# Patient Record
Sex: MALE | Race: WHITE | NOT HISPANIC OR LATINO | Employment: FULL TIME | URBAN - METROPOLITAN AREA
[De-identification: names, ages, dates, MRNs, and addresses within clinical notes are randomized per-mention and may not be internally consistent; named-entity substitution may affect disease eponyms.]

---

## 2019-01-04 ENCOUNTER — HOSPITAL ENCOUNTER (OUTPATIENT)
Dept: OTHER | Facility: HOSPITAL | Age: 54
Discharge: HOME | End: 2019-01-04
Attending: OPHTHALMOLOGY | Admitting: OPHTHALMOLOGY

## 2019-01-04 LAB
ERYTHROCYTE [SEDIMENTATION RATE] IN BLOOD BY WESTERGREN METHOD: 8 MM/HR (ref 0–20)
T PALLIDUM IGG SER QL: NONREACTIVE

## 2019-01-06 LAB — B HENSELAE IGM TITR SER IF: NORMAL TITER

## 2019-01-07 ENCOUNTER — HOSPITAL ENCOUNTER (OUTPATIENT)
Dept: LAB | Facility: HOSPITAL | Age: 54
Discharge: HOME | End: 2019-01-07
Attending: OPHTHALMOLOGY | Admitting: OPHTHALMOLOGY

## 2019-01-07 LAB — B BURGDOR IGG+IGM SER-ACNC: NEGATIVE

## 2019-01-07 PROCEDURE — 86481 TB AG RESPONSE T-CELL SUSP: CPT

## 2019-01-08 ENCOUNTER — LAB REQUISITION (OUTPATIENT)
Dept: LAB | Facility: HOSPITAL | Age: 54
End: 2019-01-08

## 2019-01-08 DIAGNOSIS — Z00.00 ROUTINE GENERAL MEDICAL EXAMINATION AT A HEALTH CARE FACILITY: ICD-10-CM

## 2019-01-09 LAB
TSPOT INTERPRETATION: NEGATIVE
TSPOT INTERPRETATION: NORMAL
TSPOT NIL CONTROL INTERPRETATION: NORMAL
TSPOT PANEL A: 0
TSPOT PANEL B: 0
TSPOT POS CONTROL INTERPRETATION: NORMAL

## 2019-03-16 ENCOUNTER — HOSPITAL ENCOUNTER (OUTPATIENT)
Dept: URGENT CARE | Facility: CLINIC | Age: 54
Setting detail: SPECIMEN
Discharge: HOME | End: 2019-03-16
Attending: FAMILY MEDICINE | Admitting: FAMILY MEDICINE

## 2019-03-18 ENCOUNTER — HOSPITAL ENCOUNTER (OUTPATIENT)
Dept: LAB | Facility: HOSPITAL | Age: 54
Discharge: HOME | End: 2019-03-18
Attending: FAMILY MEDICINE | Admitting: FAMILY MEDICINE

## 2019-03-18 LAB
ALBUMIN SERPL-MCNC: 3.2 G/DL (ref 3.5–4.8)
ALBUMIN/GLOB SERPL: 1 {RATIO} (ref 1–1.7)
ALP SERPL-CCNC: 194 IU/L (ref 32–91)
ALT SERPL-CCNC: 147 IU/L (ref 17–63)
ANION GAP SERPL CALC-SCNC: 13.3 MMOL/L (ref 10–20)
AST SERPL-CCNC: 50 IU/L (ref 15–41)
BASOPHILS # BLD AUTO: 0 10*3/UL (ref 0–0.2)
BASOPHILS NFR BLD AUTO: 0 % (ref 0–2)
BILIRUB SERPL-MCNC: 1 MG/DL (ref 0.3–1.2)
BUN SERPL-MCNC: 10 MG/DL (ref 8–20)
BUN/CREAT SERPL: 12.5 (ref 6.2–20.3)
CALCIUM SERPL-MCNC: 9.2 MG/DL (ref 8.9–10.3)
CHLORIDE SERPL-SCNC: 100 MMOL/L (ref 101–111)
CK SERPL-CCNC: 26 IU/L (ref 49–397)
CONV CO2: 28 MMOL/L (ref 22–32)
CONV TOTAL PROTEIN: 6.5 G/DL (ref 6.1–7.9)
CREAT UR-MCNC: 0.8 MG/DL (ref 0.7–1.2)
DIFFERENTIAL METHOD BLD: (no result)
EOSINOPHIL # BLD AUTO: 0.5 10*3/UL (ref 0–0.3)
EOSINOPHIL # BLD AUTO: 5 % (ref 0–3)
ERYTHROCYTE [DISTWIDTH] IN BLOOD BY AUTOMATED COUNT: 13.8 % (ref 11.5–14.5)
GLOBULIN UR ELPH-MCNC: 3.3 G/DL (ref 2.5–3.8)
GLUCOSE SERPL-MCNC: 112 MG/DL (ref 65–99)
HCT VFR BLD AUTO: 42 % (ref 40–54)
HGB BLD-MCNC: 14.1 G/DL (ref 14–18)
LYMPHOCYTES # BLD AUTO: 1.6 10*3/UL (ref 0.8–4.8)
LYMPHOCYTES NFR BLD AUTO: 17 % (ref 18–42)
MCH RBC QN AUTO: 30.5 PG (ref 26–32)
MCHC RBC AUTO-ENTMCNC: 33.6 G/DL (ref 32–36)
MCV RBC AUTO: 90.8 FL (ref 80–94)
MONOCYTES # BLD AUTO: 1 10*3/UL (ref 0.1–1.3)
MONOCYTES NFR BLD AUTO: 11 % (ref 2–11)
NEUTROPHILS # BLD AUTO: 6.4 10*3/UL (ref 2.3–8.6)
NEUTROPHILS NFR BLD AUTO: 67 % (ref 50–75)
NRBC BLD AUTO-RTO: 0 /100{WBCS}
NRBC/RBC NFR BLD MANUAL: 0 10*3/UL
PLATELET # BLD AUTO: 239 10*3/UL (ref 150–450)
PMV BLD AUTO: 8.1 FL (ref 7.4–10.4)
POTASSIUM SERPL-SCNC: 4.3 MMOL/L (ref 3.6–5.1)
RBC # BLD AUTO: 4.62 10*6/UL (ref 4.6–6)
SODIUM SERPL-SCNC: 137 MMOL/L (ref 136–144)
WBC # BLD AUTO: 9.6 10*3/UL (ref 4.5–11.5)

## 2020-07-10 ENCOUNTER — HOSPITAL ENCOUNTER (OUTPATIENT)
Facility: HOSPITAL | Age: 55
Discharge: HOME | End: 2020-07-11
Attending: FAMILY MEDICINE | Admitting: HOSPITALIST

## 2020-07-10 ENCOUNTER — APPOINTMENT (OUTPATIENT)
Dept: CT IMAGING | Facility: HOSPITAL | Age: 55
End: 2020-07-10

## 2020-07-10 ENCOUNTER — APPOINTMENT (OUTPATIENT)
Dept: ULTRASOUND IMAGING | Facility: HOSPITAL | Age: 55
End: 2020-07-10

## 2020-07-10 DIAGNOSIS — K80.20 GALLSTONES: ICD-10-CM

## 2020-07-10 DIAGNOSIS — D72.829 LEUKOCYTOSIS, UNSPECIFIED TYPE: ICD-10-CM

## 2020-07-10 DIAGNOSIS — R10.11 RUQ PAIN: Primary | ICD-10-CM

## 2020-07-10 LAB
ALBUMIN SERPL-MCNC: 4.4 G/DL (ref 3.5–5.2)
ALBUMIN/GLOB SERPL: 2 G/DL
ALP SERPL-CCNC: 64 U/L (ref 39–117)
ALT SERPL W P-5'-P-CCNC: 18 U/L (ref 1–41)
ANION GAP SERPL CALCULATED.3IONS-SCNC: 12 MMOL/L (ref 5–15)
AST SERPL-CCNC: 17 U/L (ref 1–40)
BACTERIA UR QL AUTO: ABNORMAL /HPF
BASOPHILS # BLD AUTO: 0.1 10*3/MM3 (ref 0–0.2)
BASOPHILS NFR BLD AUTO: 0.5 % (ref 0–1.5)
BILIRUB SERPL-MCNC: 0.9 MG/DL (ref 0–1.2)
BILIRUB UR QL STRIP: ABNORMAL
BUN SERPL-MCNC: 13 MG/DL (ref 6–20)
BUN SERPL-MCNC: ABNORMAL MG/DL
BUN/CREAT SERPL: ABNORMAL
CALCIUM SPEC-SCNC: 9.1 MG/DL (ref 8.6–10.5)
CHLORIDE SERPL-SCNC: 100 MMOL/L (ref 98–107)
CLARITY UR: CLEAR
CO2 SERPL-SCNC: 24 MMOL/L (ref 22–29)
COLOR UR: ABNORMAL
CREAT SERPL-MCNC: 0.68 MG/DL (ref 0.76–1.27)
DEPRECATED RDW RBC AUTO: 40.7 FL (ref 37–54)
EOSINOPHIL # BLD AUTO: 0 10*3/MM3 (ref 0–0.4)
EOSINOPHIL NFR BLD AUTO: 0.1 % (ref 0.3–6.2)
ERYTHROCYTE [DISTWIDTH] IN BLOOD BY AUTOMATED COUNT: 13.1 % (ref 12.3–15.4)
GFR SERPL CREATININE-BSD FRML MDRD: 120 ML/MIN/1.73
GLOBULIN UR ELPH-MCNC: 2.2 GM/DL
GLUCOSE SERPL-MCNC: 128 MG/DL (ref 65–99)
GLUCOSE UR STRIP-MCNC: NEGATIVE MG/DL
HCT VFR BLD AUTO: 43.4 % (ref 37.5–51)
HGB BLD-MCNC: 15 G/DL (ref 13–17.7)
HGB UR QL STRIP.AUTO: ABNORMAL
HOLD SPECIMEN: NORMAL
HYALINE CASTS UR QL AUTO: ABNORMAL /LPF
KETONES UR QL STRIP: ABNORMAL
LEUKOCYTE ESTERASE UR QL STRIP.AUTO: NEGATIVE
LIPASE SERPL-CCNC: 21 U/L (ref 13–60)
LYMPHOCYTES # BLD AUTO: 1.3 10*3/MM3 (ref 0.7–3.1)
LYMPHOCYTES NFR BLD AUTO: 8.8 % (ref 19.6–45.3)
MCH RBC QN AUTO: 30.3 PG (ref 26.6–33)
MCHC RBC AUTO-ENTMCNC: 34.5 G/DL (ref 31.5–35.7)
MCV RBC AUTO: 87.9 FL (ref 79–97)
MONOCYTES # BLD AUTO: 0.3 10*3/MM3 (ref 0.1–0.9)
MONOCYTES NFR BLD AUTO: 2.2 % (ref 5–12)
NEUTROPHILS NFR BLD AUTO: 13.2 10*3/MM3 (ref 1.7–7)
NEUTROPHILS NFR BLD AUTO: 88.4 % (ref 42.7–76)
NITRITE UR QL STRIP: NEGATIVE
NRBC BLD AUTO-RTO: 0 /100 WBC (ref 0–0.2)
PH UR STRIP.AUTO: 5.5 [PH] (ref 5–8)
PLATELET # BLD AUTO: 216 10*3/MM3 (ref 140–450)
PMV BLD AUTO: 8 FL (ref 6–12)
POTASSIUM SERPL-SCNC: 4.5 MMOL/L (ref 3.5–5.2)
PROT SERPL-MCNC: 6.6 G/DL (ref 6–8.5)
PROT UR QL STRIP: ABNORMAL
RBC # BLD AUTO: 4.93 10*6/MM3 (ref 4.14–5.8)
RBC # UR: ABNORMAL /HPF
REF LAB TEST METHOD: ABNORMAL
SODIUM SERPL-SCNC: 136 MMOL/L (ref 136–145)
SP GR UR STRIP: 1.04 (ref 1–1.03)
SQUAMOUS #/AREA URNS HPF: ABNORMAL /HPF
UROBILINOGEN UR QL STRIP: ABNORMAL
WBC # BLD AUTO: 14.9 10*3/MM3 (ref 3.4–10.8)
WBC UR QL AUTO: ABNORMAL /HPF
WHOLE BLOOD HOLD SPECIMEN: NORMAL

## 2020-07-10 PROCEDURE — 96376 TX/PRO/DX INJ SAME DRUG ADON: CPT

## 2020-07-10 PROCEDURE — 25810000003 SODIUM CHLORIDE 0.9 % WITH KCL 20 MEQ 20-0.9 MEQ/L-% SOLUTION: Performed by: NURSE PRACTITIONER

## 2020-07-10 PROCEDURE — 25010000002 MORPHINE PER 10 MG: Performed by: NURSE PRACTITIONER

## 2020-07-10 PROCEDURE — 25010000002 ONDANSETRON PER 1 MG: Performed by: NURSE PRACTITIONER

## 2020-07-10 PROCEDURE — 81001 URINALYSIS AUTO W/SCOPE: CPT | Performed by: NURSE PRACTITIONER

## 2020-07-10 PROCEDURE — G0378 HOSPITAL OBSERVATION PER HR: HCPCS

## 2020-07-10 PROCEDURE — 96374 THER/PROPH/DIAG INJ IV PUSH: CPT

## 2020-07-10 PROCEDURE — 76705 ECHO EXAM OF ABDOMEN: CPT

## 2020-07-10 PROCEDURE — 25010000002 MORPHINE PER 10 MG

## 2020-07-10 PROCEDURE — 83690 ASSAY OF LIPASE: CPT | Performed by: NURSE PRACTITIONER

## 2020-07-10 PROCEDURE — 0 IOPAMIDOL PER 1 ML: Performed by: NURSE PRACTITIONER

## 2020-07-10 PROCEDURE — 96375 TX/PRO/DX INJ NEW DRUG ADDON: CPT

## 2020-07-10 PROCEDURE — 80053 COMPREHEN METABOLIC PANEL: CPT | Performed by: NURSE PRACTITIONER

## 2020-07-10 PROCEDURE — 99284 EMERGENCY DEPT VISIT MOD MDM: CPT

## 2020-07-10 PROCEDURE — 25010000002 HYDROMORPHONE PER 4 MG: Performed by: NURSE PRACTITIONER

## 2020-07-10 PROCEDURE — 85025 COMPLETE CBC W/AUTO DIFF WBC: CPT | Performed by: NURSE PRACTITIONER

## 2020-07-10 PROCEDURE — 99219 PR INITIAL OBSERVATION CARE/DAY 50 MINUTES: CPT | Performed by: NURSE PRACTITIONER

## 2020-07-10 PROCEDURE — 74177 CT ABD & PELVIS W/CONTRAST: CPT

## 2020-07-10 RX ORDER — MORPHINE SULFATE 4 MG/ML
INJECTION, SOLUTION INTRAMUSCULAR; INTRAVENOUS
Status: COMPLETED
Start: 2020-07-10 | End: 2020-07-10

## 2020-07-10 RX ORDER — MORPHINE SULFATE 4 MG/ML
4 INJECTION, SOLUTION INTRAMUSCULAR; INTRAVENOUS ONCE
Status: COMPLETED | OUTPATIENT
Start: 2020-07-10 | End: 2020-07-10

## 2020-07-10 RX ORDER — SODIUM CHLORIDE AND POTASSIUM CHLORIDE 150; 900 MG/100ML; MG/100ML
100 INJECTION, SOLUTION INTRAVENOUS CONTINUOUS
Status: DISCONTINUED | OUTPATIENT
Start: 2020-07-10 | End: 2020-07-11 | Stop reason: HOSPADM

## 2020-07-10 RX ORDER — ONDANSETRON 4 MG/1
4 TABLET, FILM COATED ORAL EVERY 6 HOURS PRN
Status: DISCONTINUED | OUTPATIENT
Start: 2020-07-10 | End: 2020-07-11 | Stop reason: HOSPADM

## 2020-07-10 RX ORDER — SODIUM CHLORIDE 0.9 % (FLUSH) 0.9 %
10 SYRINGE (ML) INJECTION EVERY 12 HOURS SCHEDULED
Status: DISCONTINUED | OUTPATIENT
Start: 2020-07-10 | End: 2020-07-11 | Stop reason: HOSPADM

## 2020-07-10 RX ORDER — ONDANSETRON 2 MG/ML
4 INJECTION INTRAMUSCULAR; INTRAVENOUS EVERY 6 HOURS PRN
Status: DISCONTINUED | OUTPATIENT
Start: 2020-07-10 | End: 2020-07-11 | Stop reason: HOSPADM

## 2020-07-10 RX ORDER — ONDANSETRON 2 MG/ML
4 INJECTION INTRAMUSCULAR; INTRAVENOUS ONCE
Status: COMPLETED | OUTPATIENT
Start: 2020-07-10 | End: 2020-07-10

## 2020-07-10 RX ORDER — NICOTINE 21 MG/24HR
1 PATCH, TRANSDERMAL 24 HOURS TRANSDERMAL
Status: DISCONTINUED | OUTPATIENT
Start: 2020-07-11 | End: 2020-07-11 | Stop reason: HOSPADM

## 2020-07-10 RX ORDER — SODIUM CHLORIDE 0.9 % (FLUSH) 0.9 %
10 SYRINGE (ML) INJECTION AS NEEDED
Status: DISCONTINUED | OUTPATIENT
Start: 2020-07-10 | End: 2020-07-11 | Stop reason: HOSPADM

## 2020-07-10 RX ORDER — HYDROMORPHONE HCL 110MG/55ML
1 PATIENT CONTROLLED ANALGESIA SYRINGE INTRAVENOUS ONCE
Status: COMPLETED | OUTPATIENT
Start: 2020-07-10 | End: 2020-07-10

## 2020-07-10 RX ADMIN — ONDANSETRON 4 MG: 2 INJECTION, SOLUTION INTRAMUSCULAR; INTRAVENOUS at 15:57

## 2020-07-10 RX ADMIN — SODIUM CHLORIDE AND POTASSIUM CHLORIDE 100 ML/HR: 9; 1.49 INJECTION, SOLUTION INTRAVENOUS at 22:11

## 2020-07-10 RX ADMIN — IOPAMIDOL 100 ML: 755 INJECTION, SOLUTION INTRAVENOUS at 17:12

## 2020-07-10 RX ADMIN — MORPHINE SULFATE 4 MG: 4 INJECTION INTRAVENOUS at 16:49

## 2020-07-10 RX ADMIN — Medication 10 ML: at 22:10

## 2020-07-10 RX ADMIN — SODIUM CHLORIDE 1000 ML: 900 INJECTION, SOLUTION INTRAVENOUS at 16:02

## 2020-07-10 RX ADMIN — HYDROMORPHONE HYDROCHLORIDE 1 MG: 2 INJECTION, SOLUTION INTRAMUSCULAR; INTRAVENOUS; SUBCUTANEOUS at 20:31

## 2020-07-10 RX ADMIN — ONDANSETRON 4 MG: 2 INJECTION INTRAMUSCULAR; INTRAVENOUS at 22:10

## 2020-07-10 RX ADMIN — MORPHINE SULFATE 4 MG: 4 INJECTION INTRAVENOUS at 15:56

## 2020-07-10 RX ADMIN — MORPHINE SULFATE 4 MG: 4 INJECTION, SOLUTION INTRAMUSCULAR; INTRAVENOUS at 16:49

## 2020-07-10 NOTE — ED PROVIDER NOTES
Subjective   Patient is a 55-year-old white male with history of diverticulitis who presents today with complaints of upper abdominal pain, nausea and dry heaves.  He states his symptoms started suddenly around 9:00 this morning.  He denies any fever or chills.  He denies any diarrhea or constipation.  He states his symptoms feel similar to those he had when he was diagnosed with diverticulitis.  He denies any dysuria frequency urgency or difficulty urinating.  He denies any chest pain or shortness of breath.  He denies any alleviating or exacerbating factors.          Review of Systems   Constitutional: Negative for chills and fever.   Respiratory: Negative for shortness of breath.    Cardiovascular: Negative for chest pain.   Gastrointestinal: Positive for abdominal pain and nausea. Negative for constipation, diarrhea and vomiting.   Genitourinary: Negative for decreased urine volume, difficulty urinating, dysuria, frequency and urgency.   Musculoskeletal: Negative for back pain.   Skin: Negative for rash.   Neurological: Negative for headaches.       No past medical history on file.    Allergies   Allergen Reactions   • Hydrocodone-Acetaminophen Hives and Nausea Only       No past surgical history on file.    No family history on file.    Social History     Socioeconomic History   • Marital status:      Spouse name: Not on file   • Number of children: Not on file   • Years of education: Not on file   • Highest education level: Not on file           Objective   Physical Exam  Vital signs and triage nurse note reviewed.  Constitutional: Awake, alert; well-developed and well-nourished.  Moderate distress is noted due to pain.  HEENT: Normocephalic, atraumatic; pupils are PERRL with intact EOM; oropharynx is pink and moist without exudate or erythema.  No drooling or pooling of oral secretions.  Neck: Supple, full range of motion without pain; no cervical lymphadenopathy. Normal phonation.  Cardiovascular:  Regular rate and rhythm, normal S1-S2.  No murmur noted.  Pulmonary: Respiratory effort regular nonlabored, breath sounds clear to auscultation all fields.  Abdomen: Soft, tender over the epigastrium and right upper quadrant, nondistended with normoactive bowel sounds; no rebound but with mild voluntary guarding.  No CVAT.  Musculoskeletal: Independent range of motion of all extremities with no palpable tenderness or edema.  Neuro: Alert oriented x3, speech is clear and appropriate, GCS 15.    Skin: Flesh tone, warm, dry, intact; no erythematous or petechial rash or lesion.      Procedures           ED Course  ED Course as of Jul 10 2010   Fri Jul 10, 2020   1839 Care turned over to JEFF Alvarenga at 1815 pending US results and disposition.    [MD]      ED Course User Index  [MD] Lilia Gonzalez APRN      Labs Reviewed   COMPREHENSIVE METABOLIC PANEL - Abnormal; Notable for the following components:       Result Value    Glucose 128 (*)     Creatinine 0.68 (*)     All other components within normal limits    Narrative:     GFR Normal >60  Chronic Kidney Disease <60  Kidney Failure <15     URINALYSIS W/ CULTURE IF INDICATED - Abnormal; Notable for the following components:    Color, UA Dark Yellow (*)     Specific Gravity, UA 1.036 (*)     Ketones, UA 40 mg/dL (2+) (*)     Bilirubin, UA Small (1+) (*)     Blood, UA Trace (*)     Protein, UA Trace (*)     All other components within normal limits   CBC WITH AUTO DIFFERENTIAL - Abnormal; Notable for the following components:    WBC 14.90 (*)     Neutrophil % 88.4 (*)     Lymphocyte % 8.8 (*)     Monocyte % 2.2 (*)     Eosinophil % 0.1 (*)     Neutrophils, Absolute 13.20 (*)     All other components within normal limits   URINALYSIS, MICROSCOPIC ONLY - Abnormal; Notable for the following components:    RBC, UA 13-20 (*)     WBC, UA 0-2 (*)     All other components within normal limits   LIPASE - Normal   BUN - Normal   CBC AND DIFFERENTIAL    Narrative:     The following  orders were created for panel order CBC & Differential.  Procedure                               Abnormality         Status                     ---------                               -----------         ------                     CBC Auto Differential[[id number]]        Abnormal            Final result                 Please view results for these tests on the individual orders.   EXTRA TUBES    Narrative:     The following orders were created for panel order Extra Tubes.  Procedure                               Abnormality         Status                     ---------                               -----------         ------                     Light Blue Top[[id number]]                                   Final result               Gold Top - SST[[id number]]                                   Final result                 Please view results for these tests on the individual orders.   LIGHT BLUE TOP   GOLD TOP - SST     Ct Abdomen Pelvis With Contrast    Result Date: 7/10/2020  1.No acute process identified within abdomen/pelvis. 2.Cholelithiasis. 3.Sigmoid diverticulosis.  Electronically Signed By-DR. Chuck Gee MD On:7/10/2020 6:09 PM This report was finalized on [id number] by DR. Chuck Gee MD.    Us Gallbladder    Result Date: 7/10/2020  Cholelithiasis, but no convincing evidence of acute cholecystitis.  Electronically Signed By-DR. Chuck Gee MD On:7/10/2020 7:43 PM This report was finalized on [id number] by DR. Chuck Gee MD.    Medications   sodium chloride 0.9 % flush 10 mL (has no administration in time range)   HYDROmorphone (DILAUDID) injection 1 mg (has no administration in time range)   sodium chloride 0.9 % bolus 1,000 mL (0 mL Intravenous Stopped 7/10/20 1714)   Morphine sulfate (PF) injection 4 mg (4 mg Intravenous Given 7/10/20 1556)   ondansetron (ZOFRAN) injection 4 mg (4 mg Intravenous Given 7/10/20 1557)   Morphine sulfate (PF) injection 4 mg (4 mg Intravenous Given  7/10/20 1649)   iopamidol (ISOVUE-370) 76 % injection 100 mL (100 mL Intravenous Given 7/10/20 1712)                                          MDM  Number of Diagnoses or Management Options  Gallstones:   Leukocytosis, unspecified type:   RUQ pain:   Diagnosis management comments: Comorbidities: Diverticulitis  Differentials: Cholecystitis, cholelithiasis, pancreatitis, gastritis, hepatitis, obstruction, diverticulitis;this list is not all inclusive and does not constitute the entirety of considered causes  Discussion with provider:  Radiology interpretation: X-rays reviewed by me and interpreted by radiologist: As above  Lab interpretation: Labs viewed by me significant for: As above    Patient had IV established.  He had labs and CT obtained.  He was given a fluid bolus as well as morphine and Zofran for pain and nausea.  Patient required additional doses of pain medication he continued to have moderate to severe pain in his upper abdomen.  White blood cell count returned at 14,000.  He is afebrile and hemodynamically stable.  CT scan shows cholelithiasis with no other acute findings.  Ultrasound of the gallbladder was ordered.    Patient care assumed from Celine MARTINEZ at 1830 - Ultrasound shows gallstones without acute infection.   Patient on reevaluation was still in a significant amount of pain and was given 1mg of Dilaudid. And will be admitted to the Hospitalist.         Patient made aware and agreeable to this care.     Will be discussed with Gayathri MARTINEZ and admitted to Dr. Madison             Amount and/or Complexity of Data Reviewed  Clinical lab tests: ordered and reviewed  Tests in the radiology section of CPT®: ordered and reviewed    Patient Progress  Patient progress: stable      Final diagnoses:   RUQ pain   Gallstones   Leukocytosis, unspecified type            Grace Peralta, APRN  07/10/20 2015

## 2020-07-11 ENCOUNTER — APPOINTMENT (OUTPATIENT)
Dept: GENERAL RADIOLOGY | Facility: HOSPITAL | Age: 55
End: 2020-07-11

## 2020-07-11 ENCOUNTER — ANESTHESIA EVENT (OUTPATIENT)
Dept: PERIOP | Facility: HOSPITAL | Age: 55
End: 2020-07-11

## 2020-07-11 ENCOUNTER — ANESTHESIA (OUTPATIENT)
Dept: PERIOP | Facility: HOSPITAL | Age: 55
End: 2020-07-11

## 2020-07-11 VITALS
HEART RATE: 76 BPM | BODY MASS INDEX: 41.81 KG/M2 | DIASTOLIC BLOOD PRESSURE: 80 MMHG | SYSTOLIC BLOOD PRESSURE: 144 MMHG | TEMPERATURE: 98.6 F | RESPIRATION RATE: 18 BRPM | HEIGHT: 66 IN | OXYGEN SATURATION: 92 % | WEIGHT: 260.14 LBS

## 2020-07-11 PROBLEM — R10.11 RUQ PAIN: Status: RESOLVED | Noted: 2020-07-10 | Resolved: 2020-07-11

## 2020-07-11 PROBLEM — K80.50 BILIARY COLIC: Status: RESOLVED | Noted: 2020-07-11 | Resolved: 2020-07-11

## 2020-07-11 PROBLEM — K80.50 BILIARY COLIC: Status: ACTIVE | Noted: 2020-07-11

## 2020-07-11 LAB
ABO GROUP BLD: NORMAL
ANION GAP SERPL CALCULATED.3IONS-SCNC: 7 MMOL/L (ref 5–15)
APTT PPP: 27.1 SECONDS (ref 24–31)
BASOPHILS # BLD AUTO: 0.1 10*3/MM3 (ref 0–0.2)
BASOPHILS NFR BLD AUTO: 0.5 % (ref 0–1.5)
BLD GP AB SCN SERPL QL: NEGATIVE
BUN SERPL-MCNC: 7 MG/DL (ref 6–20)
BUN SERPL-MCNC: ABNORMAL MG/DL
BUN/CREAT SERPL: ABNORMAL
CALCIUM SPEC-SCNC: 8.8 MG/DL (ref 8.6–10.5)
CHLORIDE SERPL-SCNC: 105 MMOL/L (ref 98–107)
CO2 SERPL-SCNC: 29 MMOL/L (ref 22–29)
CREAT SERPL-MCNC: 0.72 MG/DL (ref 0.76–1.27)
DEPRECATED RDW RBC AUTO: 40.7 FL (ref 37–54)
EOSINOPHIL # BLD AUTO: 0.1 10*3/MM3 (ref 0–0.4)
EOSINOPHIL NFR BLD AUTO: 0.6 % (ref 0.3–6.2)
ERYTHROCYTE [DISTWIDTH] IN BLOOD BY AUTOMATED COUNT: 13.1 % (ref 12.3–15.4)
GFR SERPL CREATININE-BSD FRML MDRD: 113 ML/MIN/1.73
GLUCOSE SERPL-MCNC: 105 MG/DL (ref 65–99)
HCT VFR BLD AUTO: 43.1 % (ref 37.5–51)
HGB BLD-MCNC: 14.4 G/DL (ref 13–17.7)
INR PPP: 1.06 (ref 0.9–1.1)
LYMPHOCYTES # BLD AUTO: 2.9 10*3/MM3 (ref 0.7–3.1)
LYMPHOCYTES NFR BLD AUTO: 20.6 % (ref 19.6–45.3)
MCH RBC QN AUTO: 30 PG (ref 26.6–33)
MCHC RBC AUTO-ENTMCNC: 33.4 G/DL (ref 31.5–35.7)
MCV RBC AUTO: 89.8 FL (ref 79–97)
MONOCYTES # BLD AUTO: 0.9 10*3/MM3 (ref 0.1–0.9)
MONOCYTES NFR BLD AUTO: 6.5 % (ref 5–12)
NEUTROPHILS NFR BLD AUTO: 10 10*3/MM3 (ref 1.7–7)
NEUTROPHILS NFR BLD AUTO: 71.8 % (ref 42.7–76)
NRBC BLD AUTO-RTO: 0.1 /100 WBC (ref 0–0.2)
PLATELET # BLD AUTO: 216 10*3/MM3 (ref 140–450)
PMV BLD AUTO: 8 FL (ref 6–12)
POTASSIUM SERPL-SCNC: 4.6 MMOL/L (ref 3.5–5.2)
PROTHROMBIN TIME: 11 SECONDS (ref 9.6–11.7)
RBC # BLD AUTO: 4.81 10*6/MM3 (ref 4.14–5.8)
RH BLD: POSITIVE
SARS-COV-2 RNA PNL SPEC NAA+PROBE: NOT DETECTED
SODIUM SERPL-SCNC: 141 MMOL/L (ref 136–145)
T&S EXPIRATION DATE: NORMAL
WBC # BLD AUTO: 13.9 10*3/MM3 (ref 3.4–10.8)

## 2020-07-11 PROCEDURE — 99244 OFF/OP CNSLTJ NEW/EST MOD 40: CPT | Performed by: SURGERY

## 2020-07-11 PROCEDURE — 93005 ELECTROCARDIOGRAM TRACING: CPT | Performed by: NURSE PRACTITIONER

## 2020-07-11 PROCEDURE — 87635 SARS-COV-2 COVID-19 AMP PRB: CPT | Performed by: FAMILY MEDICINE

## 2020-07-11 PROCEDURE — 86901 BLOOD TYPING SEROLOGIC RH(D): CPT | Performed by: FAMILY MEDICINE

## 2020-07-11 PROCEDURE — 86850 RBC ANTIBODY SCREEN: CPT | Performed by: FAMILY MEDICINE

## 2020-07-11 PROCEDURE — 86900 BLOOD TYPING SEROLOGIC ABO: CPT | Performed by: FAMILY MEDICINE

## 2020-07-11 PROCEDURE — 88304 TISSUE EXAM BY PATHOLOGIST: CPT | Performed by: SURGERY

## 2020-07-11 PROCEDURE — 99217 PR OBSERVATION CARE DISCHARGE MANAGEMENT: CPT | Performed by: HOSPITALIST

## 2020-07-11 PROCEDURE — G0378 HOSPITAL OBSERVATION PER HR: HCPCS

## 2020-07-11 PROCEDURE — 85610 PROTHROMBIN TIME: CPT | Performed by: FAMILY MEDICINE

## 2020-07-11 PROCEDURE — 25010000002 FENTANYL CITRATE (PF) 100 MCG/2ML SOLUTION: Performed by: ANESTHESIOLOGY

## 2020-07-11 PROCEDURE — C9803 HOPD COVID-19 SPEC COLLECT: HCPCS | Performed by: NURSE PRACTITIONER

## 2020-07-11 PROCEDURE — 96361 HYDRATE IV INFUSION ADD-ON: CPT

## 2020-07-11 PROCEDURE — 85730 THROMBOPLASTIN TIME PARTIAL: CPT | Performed by: FAMILY MEDICINE

## 2020-07-11 PROCEDURE — 85025 COMPLETE CBC W/AUTO DIFF WBC: CPT | Performed by: FAMILY MEDICINE

## 2020-07-11 PROCEDURE — 47562 LAPAROSCOPIC CHOLECYSTECTOMY: CPT | Performed by: SURGERY

## 2020-07-11 PROCEDURE — 86900 BLOOD TYPING SEROLOGIC ABO: CPT

## 2020-07-11 PROCEDURE — 71045 X-RAY EXAM CHEST 1 VIEW: CPT

## 2020-07-11 PROCEDURE — 96376 TX/PRO/DX INJ SAME DRUG ADON: CPT

## 2020-07-11 PROCEDURE — 25010000002 ONDANSETRON PER 1 MG: Performed by: NURSE PRACTITIONER

## 2020-07-11 PROCEDURE — 25010000002 DEXAMETHASONE PER 1 MG: Performed by: ANESTHESIOLOGY

## 2020-07-11 PROCEDURE — 80048 BASIC METABOLIC PNL TOTAL CA: CPT | Performed by: FAMILY MEDICINE

## 2020-07-11 PROCEDURE — 86901 BLOOD TYPING SEROLOGIC RH(D): CPT

## 2020-07-11 PROCEDURE — 25010000002 PROPOFOL 10 MG/ML EMULSION: Performed by: ANESTHESIOLOGY

## 2020-07-11 PROCEDURE — 25010000002 HYDROMORPHONE PER 4 MG: Performed by: ANESTHESIOLOGY

## 2020-07-11 DEVICE — LIGAMAX 5 MM ENDOSCOPIC MULTIPLE CLIP APPLIER
Type: IMPLANTABLE DEVICE | Site: ABDOMEN | Status: FUNCTIONAL
Brand: LIGAMAX

## 2020-07-11 RX ORDER — LIDOCAINE HYDROCHLORIDE 20 MG/ML
INJECTION, SOLUTION EPIDURAL; INFILTRATION; INTRACAUDAL; PERINEURAL AS NEEDED
Status: DISCONTINUED | OUTPATIENT
Start: 2020-07-11 | End: 2020-07-11 | Stop reason: SURG

## 2020-07-11 RX ORDER — NEOSTIGMINE METHYLSULFATE 5 MG/5 ML
SYRINGE (ML) INTRAVENOUS AS NEEDED
Status: DISCONTINUED | OUTPATIENT
Start: 2020-07-11 | End: 2020-07-11 | Stop reason: SURG

## 2020-07-11 RX ORDER — HYDROMORPHONE HCL 110MG/55ML
PATIENT CONTROLLED ANALGESIA SYRINGE INTRAVENOUS AS NEEDED
Status: DISCONTINUED | OUTPATIENT
Start: 2020-07-11 | End: 2020-07-11 | Stop reason: SURG

## 2020-07-11 RX ORDER — SODIUM CHLORIDE 9 MG/ML
INJECTION, SOLUTION INTRAVENOUS CONTINUOUS PRN
Status: DISCONTINUED | OUTPATIENT
Start: 2020-07-11 | End: 2020-07-11 | Stop reason: SURG

## 2020-07-11 RX ORDER — GLYCOPYRROLATE 1 MG/5 ML
SYRINGE (ML) INTRAVENOUS AS NEEDED
Status: DISCONTINUED | OUTPATIENT
Start: 2020-07-11 | End: 2020-07-11 | Stop reason: SURG

## 2020-07-11 RX ORDER — LABETALOL HYDROCHLORIDE 5 MG/ML
INJECTION, SOLUTION INTRAVENOUS AS NEEDED
Status: DISCONTINUED | OUTPATIENT
Start: 2020-07-11 | End: 2020-07-11 | Stop reason: SURG

## 2020-07-11 RX ORDER — SODIUM CHLORIDE 0.9 % (FLUSH) 0.9 %
3 SYRINGE (ML) INJECTION EVERY 12 HOURS SCHEDULED
Status: DISCONTINUED | OUTPATIENT
Start: 2020-07-11 | End: 2020-07-11 | Stop reason: HOSPADM

## 2020-07-11 RX ORDER — OXYCODONE HYDROCHLORIDE 5 MG/1
10 TABLET ORAL EVERY 4 HOURS PRN
Status: DISCONTINUED | OUTPATIENT
Start: 2020-07-11 | End: 2020-07-11 | Stop reason: HOSPADM

## 2020-07-11 RX ORDER — OXYCODONE HYDROCHLORIDE 5 MG/1
15 TABLET ORAL EVERY 4 HOURS PRN
Status: DISCONTINUED | OUTPATIENT
Start: 2020-07-11 | End: 2020-07-11 | Stop reason: HOSPADM

## 2020-07-11 RX ORDER — DEXAMETHASONE SODIUM PHOSPHATE 4 MG/ML
INJECTION, SOLUTION INTRA-ARTICULAR; INTRALESIONAL; INTRAMUSCULAR; INTRAVENOUS; SOFT TISSUE AS NEEDED
Status: DISCONTINUED | OUTPATIENT
Start: 2020-07-11 | End: 2020-07-11 | Stop reason: SURG

## 2020-07-11 RX ORDER — PROMETHAZINE HYDROCHLORIDE 25 MG/ML
12.5 INJECTION, SOLUTION INTRAMUSCULAR; INTRAVENOUS EVERY 6 HOURS PRN
Status: DISCONTINUED | OUTPATIENT
Start: 2020-07-11 | End: 2020-07-11 | Stop reason: HOSPADM

## 2020-07-11 RX ORDER — SODIUM CHLORIDE 0.9 % (FLUSH) 0.9 %
3-10 SYRINGE (ML) INJECTION AS NEEDED
Status: DISCONTINUED | OUTPATIENT
Start: 2020-07-11 | End: 2020-07-11 | Stop reason: HOSPADM

## 2020-07-11 RX ORDER — DEXTROSE AND SODIUM CHLORIDE 5; .45 G/100ML; G/100ML
125 INJECTION, SOLUTION INTRAVENOUS CONTINUOUS
Status: DISCONTINUED | OUTPATIENT
Start: 2020-07-11 | End: 2020-07-11 | Stop reason: HOSPADM

## 2020-07-11 RX ORDER — OXYCODONE HYDROCHLORIDE 5 MG/1
5 TABLET ORAL EVERY 6 HOURS PRN
Qty: 10 TABLET | Refills: 0 | Status: SHIPPED | OUTPATIENT
Start: 2020-07-11 | End: 2020-07-31

## 2020-07-11 RX ORDER — ROCURONIUM BROMIDE 10 MG/ML
INJECTION, SOLUTION INTRAVENOUS AS NEEDED
Status: DISCONTINUED | OUTPATIENT
Start: 2020-07-11 | End: 2020-07-11 | Stop reason: SURG

## 2020-07-11 RX ORDER — MEPERIDINE HYDROCHLORIDE 25 MG/ML
12.5 INJECTION INTRAMUSCULAR; INTRAVENOUS; SUBCUTANEOUS
Status: DISCONTINUED | OUTPATIENT
Start: 2020-07-11 | End: 2020-07-11 | Stop reason: HOSPADM

## 2020-07-11 RX ORDER — BUPIVACAINE HYDROCHLORIDE AND EPINEPHRINE 2.5; 5 MG/ML; UG/ML
INJECTION, SOLUTION EPIDURAL; INFILTRATION; INTRACAUDAL; PERINEURAL AS NEEDED
Status: DISCONTINUED | OUTPATIENT
Start: 2020-07-11 | End: 2020-07-11 | Stop reason: HOSPADM

## 2020-07-11 RX ORDER — ONDANSETRON 4 MG/1
4 TABLET, FILM COATED ORAL EVERY 6 HOURS PRN
Status: DISCONTINUED | OUTPATIENT
Start: 2020-07-11 | End: 2020-07-11 | Stop reason: HOSPADM

## 2020-07-11 RX ORDER — ONDANSETRON 2 MG/ML
4 INJECTION INTRAMUSCULAR; INTRAVENOUS ONCE AS NEEDED
Status: DISCONTINUED | OUTPATIENT
Start: 2020-07-11 | End: 2020-07-11 | Stop reason: HOSPADM

## 2020-07-11 RX ORDER — PROPOFOL 10 MG/ML
VIAL (ML) INTRAVENOUS AS NEEDED
Status: DISCONTINUED | OUTPATIENT
Start: 2020-07-11 | End: 2020-07-11 | Stop reason: SURG

## 2020-07-11 RX ORDER — ONDANSETRON 2 MG/ML
4 INJECTION INTRAMUSCULAR; INTRAVENOUS EVERY 6 HOURS PRN
Status: DISCONTINUED | OUTPATIENT
Start: 2020-07-11 | End: 2020-07-11 | Stop reason: HOSPADM

## 2020-07-11 RX ORDER — FENTANYL CITRATE 50 UG/ML
INJECTION, SOLUTION INTRAMUSCULAR; INTRAVENOUS AS NEEDED
Status: DISCONTINUED | OUTPATIENT
Start: 2020-07-11 | End: 2020-07-11 | Stop reason: SURG

## 2020-07-11 RX ADMIN — ROCURONIUM BROMIDE 50 MG: 10 INJECTION, SOLUTION INTRAVENOUS at 14:30

## 2020-07-11 RX ADMIN — DEXTROSE AND SODIUM CHLORIDE 125 ML/HR: 5; .45 INJECTION, SOLUTION INTRAVENOUS at 18:07

## 2020-07-11 RX ADMIN — CEFAZOLIN SODIUM 2 G: 1 INJECTION, POWDER, FOR SOLUTION INTRAMUSCULAR; INTRAVENOUS at 14:35

## 2020-07-11 RX ADMIN — PROPOFOL 300 MG: 10 INJECTION, EMULSION INTRAVENOUS at 14:30

## 2020-07-11 RX ADMIN — OXYCODONE HYDROCHLORIDE 10 MG: 5 TABLET ORAL at 16:36

## 2020-07-11 RX ADMIN — ONDANSETRON 4 MG: 2 INJECTION INTRAMUSCULAR; INTRAVENOUS at 15:05

## 2020-07-11 RX ADMIN — Medication 3 MG: at 15:16

## 2020-07-11 RX ADMIN — HYDROMORPHONE HYDROCHLORIDE 1 MG: 2 INJECTION INTRAMUSCULAR; INTRAVENOUS; SUBCUTANEOUS at 15:05

## 2020-07-11 RX ADMIN — FENTANYL CITRATE 100 MCG: 50 INJECTION, SOLUTION INTRAMUSCULAR; INTRAVENOUS at 14:30

## 2020-07-11 RX ADMIN — HYDROMORPHONE HYDROCHLORIDE 0.5 MG: 2 INJECTION INTRAMUSCULAR; INTRAVENOUS; SUBCUTANEOUS at 14:52

## 2020-07-11 RX ADMIN — Medication 0.4 MG: at 15:16

## 2020-07-11 RX ADMIN — HYDROMORPHONE HYDROCHLORIDE 0.5 MG: 2 INJECTION INTRAMUSCULAR; INTRAVENOUS; SUBCUTANEOUS at 14:59

## 2020-07-11 RX ADMIN — LABETALOL 20 MG/4 ML (5 MG/ML) INTRAVENOUS SYRINGE 5 MG: at 15:11

## 2020-07-11 RX ADMIN — SODIUM CHLORIDE: 0.9 INJECTION, SOLUTION INTRAVENOUS at 14:25

## 2020-07-11 RX ADMIN — DEXAMETHASONE SODIUM PHOSPHATE 8 MG: 4 INJECTION, SOLUTION INTRAMUSCULAR; INTRAVENOUS at 14:45

## 2020-07-11 RX ADMIN — LIDOCAINE HYDROCHLORIDE 50 MG: 20 INJECTION, SOLUTION EPIDURAL; INFILTRATION; INTRACAUDAL; PERINEURAL at 14:30

## 2020-07-11 NOTE — ANESTHESIA PROCEDURE NOTES
Airway  Urgency: elective    Date/Time: 7/11/2020 2:34 PM  Airway not difficult    General Information and Staff    Patient location during procedure: OR  Anesthesiologist: Micheal Srivastava MD    Indications and Patient Condition  Indications for airway management: airway protection    Preoxygenated: yes  MILS maintained throughout  Mask difficulty assessment: 2 - vent by mask + OA or adjuvant +/- NMBA    Final Airway Details  Final airway type: endotracheal airway      Successful airway: ETT  Cuffed: yes   Successful intubation technique: video laryngoscopy  Facilitating devices/methods: intubating stylet  Endotracheal tube insertion site: oral  Blade: Glidescope  Blade size: 4  ETT size (mm): 7.5  Cormack-Lehane Classification: grade IIa - partial view of glottis  Placement verified by: chest auscultation and capnometry   Measured from: teeth  ETT/EBT  to teeth (cm): 23  Number of attempts at approach: 1  Assessment: lips, teeth, and gum same as pre-op and atraumatic intubation    Additional Comments  Preox, elective glidescope, atraumatic

## 2020-07-11 NOTE — H&P
[facility] Medicine Services      Patient Name: Rodolfo Ramon  : 1965  MRN: [id number]  Primary Care Physician: Luh, No Known  Date of admission: 7/10/2020    Patient Care Team:  Provider, No Known as PCP - General          Subjective   History Present Illness     Chief Complaint:   Chief Complaint   Patient presents with   • Abdominal Pain       Mr. Ramon is a 55 y.o.  presents to [facility] complaining of abdominal pain.      55-year-old male presents the ER with a chief complaint of severe right upper quadrant pain which began suddenly this morning.  The patient states he thought he might have food poisoning because he went out to eat for fried chicken last night and his wife had diarrhea.  The patient has not had any diarrhea but has had nausea with vomiting x1 this morning.  The patient denies any subjective fever or chills.  As the pain continued throughout the day patient decided to come to the emergency room for evaluation.  The right upper abdomen is tender to palpation.    Of note: The patient fell off a 10 step ladder yesterday, landing on his back.  He denies any injuries at that time and did not seek medical attention.      Review of Systems   Constitution: Positive for decreased appetite and weight loss. Negative for chills and fever.        The patient has gone from a BMI of 56 to a BMI of 42 since 2017, he thinks due to cutting out sugared drinks.  He has made no other lifestyle changes.   Cardiovascular: Negative for chest pain.   Musculoskeletal: Negative for back pain.   Gastrointestinal: Positive for bloating, abdominal pain, nausea and vomiting. Negative for constipation and diarrhea.   All other systems reviewed and are negative.          Personal History     Past Medical History: History reviewed. No pertinent past medical history.    Surgical History:    History reviewed. No pertinent surgical history.        Family History: family history is  not on file. Otherwise pertinent FHx was reviewed and unremarkable.     Social History:  reports that he has been smoking cigarettes. He has a 30.00 pack-year smoking history. He does not have any smokeless tobacco history on file. He reports that he does not drink alcohol or use drugs.      Medications: no home medication       Allergies:    Allergies   Allergen Reactions   • Hydrocodone-Acetaminophen Hives and Nausea Only       Objective   Objective     Vital Signs  Temp:  [98 °F (36.7 °C)] 98 °F (36.7 °C)  Heart Rate:  [64-85] 80  Resp:  [14-18] 16  BP: (134-163)/(54-80) 136/71  SpO2:  [96 %-100 %] 97 %  on   ;   Device (Oxygen Therapy): room air  Body mass index is 41.99 kg/m².    Physical Exam   Constitutional: He is oriented to person, place, and time. He appears well-developed. No distress.   BMI 42   HENT:   Head: Normocephalic and atraumatic.   Eyes: Pupils are equal, round, and reactive to light. Conjunctivae and EOM are normal. No scleral icterus.   Neck: Normal range of motion. Neck supple. No JVD present. No tracheal deviation present. No thyromegaly present.   Cardiovascular: Normal rate, regular rhythm, normal heart sounds and intact distal pulses.   No murmur heard.  Pulmonary/Chest: Effort normal and breath sounds normal. No respiratory distress.   Abdominal: Soft. Bowel sounds are normal. He exhibits no distension.   Musculoskeletal: Normal range of motion.   Lymphadenopathy:     He has no cervical adenopathy.   Neurological: He is alert and oriented to person, place, and time.   Skin: Skin is warm and dry. Capillary refill takes less than 2 seconds. He is not diaphoretic. There is erythema.   Patient has a sunburn, first-degree to face and arms    Psychiatric: He has a normal mood and affect. His behavior is normal. Judgment and thought content normal.   Nursing note and vitals reviewed.      Results Review:  I have personally reviewed most recent cardiac tracings, lab results and radiology images  and interpretations and agree with findings, most notably: Leukocytosis; cholelithiasis.    Results from last 7 days   Lab Units 07/10/20  1601   WBC 10*3/mm3 14.90*   HEMOGLOBIN g/dL 15.0   HEMATOCRIT % 43.4   PLATELETS 10*3/mm3 216     Results from last 7 days   Lab Units 07/10/20  1601   SODIUM mmol/L 136   POTASSIUM mmol/L 4.5   CHLORIDE mmol/L 100   CO2 mmol/L 24.0   BUN  13   CREATININE mg/dL 0.68*   GLUCOSE mg/dL 128*   CALCIUM mg/dL 9.1   ALT (SGPT) U/L 18   AST (SGOT) U/L 17     Estimated Creatinine Clearance: 144.9 mL/min (A) (by C-G formula based on SCr of 0.68 mg/dL (L)).  Brief Urine Lab Results  (Last result in the past 365 days)      Color   Clarity   Blood   Leuk Est   Nitrite   Protein   CREAT   Urine HCG        07/10/20 1617 Dark Yellow  Comment:  Result checked  Clear Trace Negative Negative Trace               Microbiology Results (last 10 days)     ** No results found for the last 240 hours. **        EKG pending  ECG/EMG Results (most recent)     None                    Ct Abdomen Pelvis With Contrast    Result Date: 7/10/2020  1.No acute process identified within abdomen/pelvis. 2.Cholelithiasis. 3.Sigmoid diverticulosis.  Electronically Signed By-DR. Chuck Gee MD On:7/10/2020 6:09 PM This report was finalized on [id number] by DR. Chuck Gee MD.    Us Gallbladder    Result Date: 7/10/2020  Cholelithiasis, but no convincing evidence of acute cholecystitis.  Electronically Signed By-DR. Chuck Gee MD On:7/10/2020 7:43 PM This report was finalized on [id number] by DR. Chuck Gee MD.        Estimated Creatinine Clearance: 144.9 mL/min (A) (by C-G formula based on SCr of 0.68 mg/dL (L)).    Assessment/Plan   Assessment/Plan       Active Hospital Problems    Diagnosis  POA   • **RUQ pain [R10.11]  Yes     Priority: High   • Morbid obesity (CMS/HCC) [E66.01]  Yes     Priority: Medium      Resolved Hospital Problems   No resolved problems to display.     Abdominal  pain, right upper quadrant--likely secondary to acute cholelithiasis without evidence of cholecystitis: Surgical consult; analgesics and antiemetics as needed`    Leukocytosis, moderate, WBC 14.9--likely reactive secondary to vomiting: Repeat WBC in a.m.    --Patient is afebrile    Nicotine dependency with tobacco abuse: Encourage smoking cessation; nicotine patch    Obesity, morbid BMI 42 decreased from 56 in 2017: Encourage continued lifestyle change      VTE Prophylaxis -   Mechanical Order History:      Ordered        07/10/20 2040  Place Sequential Compression Device  Once         07/10/20 2040  Maintain Sequential Compression Device  Continuous                 Pharmalogical Order History:     None          CODE STATUS:    Code Status and Medical Interventions:   Ordered at: 07/10/20 2040     Code Status:    CPR     Medical Interventions (Level of Support Prior to Arrest):    Full       This patient has been examined wearing appropriate Personal Protective Equipment. 07/11/20      I discussed the patient's findings and my recommendations with patient.        Electronically signed by STEPHEN Mack, 07/10/20, 8:41 PM.  [facility] Hospitalist Team

## 2020-07-11 NOTE — PLAN OF CARE
Problem: Patient Care Overview  Goal: Plan of Care Review  Outcome: Ongoing (interventions implemented as appropriate)  Flowsheets  Taken 7/11/2020 0750  Plan of Care Reviewed With: patient  Taken 7/11/2020 1430  Outcome Summary: Dr Coulter to remove gallbladder this afternoon.  Goal: Discharge Needs Assessment  Outcome: Ongoing (interventions implemented as appropriate)  Flowsheets  Taken 7/11/2020 1430 by Mary Dumont, RN  Equipment Needed After Discharge: none  Anticipated Changes Related to Illness: none  Transportation Concerns: car, none  Concerns to be Addressed: no discharge needs identified  Readmission Within the Last 30 Days: no previous admission in last 30 days  Taken 7/10/2020 2201 by Janae Burgess, RN  Equipment Currently Used at Home: none  Taken 7/10/2020 2204 by Janae Burgess, RN  Transportation Anticipated: family or friend will provide  Patient/Family Anticipated Services at Transition: none  Patient/Family Anticipates Transition to: home with family  Goal: Interprofessional Rounds/Family Conf  Outcome: Ongoing (interventions implemented as appropriate)  Flowsheets (Taken 7/11/2020 1430)  Participants: nursing; patient; physician     Problem: Pain, Acute (Adult)  Goal: Identify Related Risk Factors and Signs and Symptoms  Outcome: Ongoing (interventions implemented as appropriate)  Flowsheets (Taken 7/11/2020 1430)  Related Risk Factors (Acute Pain): other (see comments); persistent pain  Signs and Symptoms (Acute Pain): verbalization of pain descriptors  Goal: Acceptable Pain Control/Comfort Level  Outcome: Ongoing (interventions implemented as appropriate)  Flowsheets (Taken 7/11/2020 1430)  Acceptable Pain Control/Comfort Level: making progress toward outcome

## 2020-07-11 NOTE — ANESTHESIA POSTPROCEDURE EVALUATION
Patient: Rodolfo Ramon    Procedure Summary     Date:  07/11/20 Room / Location:  [facility] OR 08 / [facility] MAIN OR    Anesthesia Start:  1423 Anesthesia Stop:  1537    Procedure:  CHOLECYSTECTOMY LAPAROSCOPIC (N/A Abdomen) Diagnosis:       RUQ pain      (RUQ pain [R10.11])    Surgeon:  Monica Coulter MD Provider:  Micheal Srivastava MD    Anesthesia Type:  general ASA Status:  3          Anesthesia Type: general    Vitals  Vitals Value Taken Time   /62 7/11/2020  4:22 PM   Temp 97.9 °F (36.6 °C) 7/11/2020  4:20 PM   Pulse 102 7/11/2020  4:24 PM   Resp 18 7/11/2020  4:10 PM   SpO2 95 % 7/11/2020  4:24 PM   Vitals shown include unvalidated device data.        Post Anesthesia Care and Evaluation    Patient location during evaluation: PACU  Patient participation: complete - patient participated  Level of consciousness: awake  Pain scale: See nurse's notes for pain score.  Pain management: adequate  Airway patency: patent  Anesthetic complications: No anesthetic complications  PONV Status: none  Cardiovascular status: acceptable  Respiratory status: acceptable  Hydration status: acceptable    Comments: Patient seen and examined postoperatively; vital signs stable; SpO2 greater than or equal to 90%; cardiopulmonary status stable; nausea/vomiting adequately controlled; pain adequately controlled; no apparent anesthesia complications; patient discharged from anesthesia care when discharge criteria were met

## 2020-07-11 NOTE — CONSULTS
General Surgery Consult Note    Requested by: Hospitalist  Reason for Consult: Symptomatic cholelithiasis      Subjective     Patient is a very pleasant 55-year-old gentleman who had [employer] fried chicken on Thursday.  He said his wife had some diarrhea but he did on Friday.  However he began to have increasing epigastric and right upper quadrant pain that radiated through to his back.  He said it became so persistent and severe his wife was going shopping and he had her turnaround and bring him back to the emergency department.  He had fallen from a ladder the day before and thought maybe this was the cause of his discomfort.  He was seen in the emergency department and noted to have cholelithiasis.    History  History reviewed. No pertinent past medical history.  History reviewed. No pertinent surgical history.  History reviewed. No pertinent family history.  Social History     Tobacco Use   • Smoking status: Current Every Day Smoker     Packs/day: 1.00     Years: 30.00     Pack years: 30.00     Types: Cigarettes   Substance Use Topics   • Alcohol use: Never     Frequency: Never   • Drug use: Never     No medications prior to admission.     Allergies:  Hydrocodone-acetaminophen    Review of Systems   Constitutional: Negative for activity change and chills.   HENT: Negative for sore throat and voice change.    Eyes: Negative for blurred vision.   Respiratory: Negative for chest tightness.    Cardiovascular: Negative for chest pain.   Gastrointestinal: Positive for abdominal pain.   Endocrine: Negative for cold intolerance.   Genitourinary: Negative for urgency.   Musculoskeletal: Negative for arthralgias.   Skin: Negative for color change.   Neurological: Negative for syncope and confusion.   Hematological: Does not bruise/bleed easily.   Psychiatric/Behavioral: Negative for hallucinations.        Objective     Vital Signs  Temp:  [98 °F (36.7 °C)-98.5 °F (36.9 °C)] 98 °F (36.7 °C)  Heart Rate:  [64-85] 67  Resp:   [12-18] 12  BP: (122-163)/(54-80) 125/67    Physical Exam   Constitutional: He is oriented to person, place, and time. He appears well-developed and well-nourished.   HENT:   Head: Normocephalic and atraumatic.   Eyes: EOM are normal.   Neck: Normal range of motion.   Cardiovascular: Normal rate.   Pulmonary/Chest: Effort normal.   Abdominal: Soft.   Tender to palpation in the epigastrium and right upper quadrant   Genitourinary:   Genitourinary Comments: Deferred   Musculoskeletal: Normal range of motion.   Neurological: He is alert and oriented to person, place, and time.   Skin: Skin is warm and dry.   Psychiatric: He has a normal mood and affect.       Results Review:  Lab Results (last 24 hours)     Procedure Component Value Units Date/Time    BUN [[id number]]  (Normal) Collected:  07/11/20 0423    Specimen:  Blood Updated:  07/11/20 0721     BUN 7 mg/dL     Basic Metabolic Panel [[id number]]  (Abnormal) Collected:  07/11/20 0423    Specimen:  Blood Updated:  07/11/20 0456     Glucose 105 mg/dL      BUN --     Comment: Testing performed by alternate method        Creatinine 0.72 mg/dL      Sodium 141 mmol/L      Potassium 4.6 mmol/L      Chloride 105 mmol/L      CO2 29.0 mmol/L      Calcium 8.8 mg/dL      eGFR Non African Amer 113 mL/min/1.73      BUN/Creatinine Ratio --     Comment: Testing not performed        Anion Gap 7.0 mmol/L     Narrative:       GFR Normal >60  Chronic Kidney Disease <60  Kidney Failure <15      Protime-INR [[id number]]  (Normal) Collected:  07/11/20 0423    Specimen:  Blood Updated:  07/11/20 0451     Protime 11.0 Seconds      INR 1.06    aPTT [[id number]]  (Normal) Collected:  07/11/20 0423    Specimen:  Blood Updated:  07/11/20 0451     PTT 27.1 seconds     CBC & Differential [[id number]] Collected:  07/11/20 0423    Specimen:  Blood Updated:  07/11/20 0435    Narrative:       The following orders were created for panel order CBC & Differential.  Procedure                                Abnormality         Status                     ---------                               -----------         ------                     CBC Auto Differential[[id number]]        Abnormal            Final result                 Please view results for these tests on the individual orders.    CBC Auto Differential [[id number]]  (Abnormal) Collected:  07/11/20 0423    Specimen:  Blood Updated:  07/11/20 0435     WBC 13.90 10*3/mm3      RBC 4.81 10*6/mm3      Hemoglobin 14.4 g/dL      Hematocrit 43.1 %      MCV 89.8 fL      MCH 30.0 pg      MCHC 33.4 g/dL      RDW 13.1 %      RDW-SD 40.7 fl      MPV 8.0 fL      Platelets 216 10*3/mm3      Neutrophil % 71.8 %      Lymphocyte % 20.6 %      Monocyte % 6.5 %      Eosinophil % 0.6 %      Basophil % 0.5 %      Neutrophils, Absolute 10.00 10*3/mm3      Lymphocytes, Absolute 2.90 10*3/mm3      Monocytes, Absolute 0.90 10*3/mm3      Eosinophils, Absolute 0.10 10*3/mm3      Basophils, Absolute 0.10 10*3/mm3      nRBC 0.1 /100 WBC     COVID-19,CEPHEID,COR/JAMEL/PAD IN-HOUSE(OR EMERGENT/ADD-ON),NP SWAB IN TRANSPORT MEDIA 3-4 HR TAT - Swab, Nasopharynx [[id number]]  (Normal) Collected:  07/11/20 0240    Specimen:  Swab from Nasopharynx Updated:  07/11/20 0412     COVID19 Not Detected    Narrative:       Fact sheet for providers: https://www.fda.gov/media/931121/download     Fact sheet for patients: https://www.fda.gov/media/496739/download    Extra Tubes [[id number]] Collected:  07/10/20 1601    Specimen:  Blood, Venous Line Updated:  07/10/20 1715    Narrative:       The following orders were created for panel order Extra Tubes.  Procedure                               Abnormality         Status                     ---------                               -----------         ------                     Light Blue Top[[id number]]                                   Final result               Gold Top - [facility][[id number]]                                   Final result                 Please view  results for these tests on the individual orders.    Gold Top - SST [[id number]] Collected:  07/10/20 1601    Specimen:  Blood Updated:  07/10/20 1715     Extra Tube Hold for add-ons.     Comment: Auto resulted.       Light Blue Top [[id number]] Collected:  07/10/20 1601    Specimen:  Blood Updated:  07/10/20 1715     Extra Tube hold for add-on     Comment: Auto resulted       Urinalysis With Culture If Indicated - Urine, Clean Catch [[id number]]  (Abnormal) Collected:  07/10/20 1617    Specimen:  Urine, Clean Catch Updated:  07/10/20 1639     Color, UA Dark Yellow     Comment: Result checked         Appearance, UA Clear     pH, UA 5.5     Specific Gravity, UA 1.036     Glucose, UA Negative     Ketones, UA 40 mg/dL (2+)     Bilirubin, UA Small (1+)     Comment: Confirmation testing is unavailable.  A serum bilirubin is recommended for further assessment.        Blood, UA Trace     Protein, UA Trace     Leuk Esterase, UA Negative     Nitrite, UA Negative     Urobilinogen, UA 0.2 E.U./dL    Urinalysis, Microscopic Only - Urine, Clean Catch [[id number]]  (Abnormal) Collected:  07/10/20 1617    Specimen:  Urine, Clean Catch Updated:  07/10/20 1639     RBC, UA 13-20 /HPF      WBC, UA 0-2 /HPF      Bacteria, UA None Seen /HPF      Squamous Epithelial Cells, UA None Seen /HPF      Hyaline Casts, UA None Seen /LPF      Methodology Automated Microscopy    BUN [[id number]]  (Normal) Collected:  07/10/20 1601    Specimen:  Blood Updated:  07/10/20 1632     BUN 13 mg/dL     Comprehensive Metabolic Panel [[id number]]  (Abnormal) Collected:  07/10/20 1601    Specimen:  Blood Updated:  07/10/20 1631     Glucose 128 mg/dL      BUN --     Comment: Testing performed by alternate method        Creatinine 0.68 mg/dL      Sodium 136 mmol/L      Potassium 4.5 mmol/L      Chloride 100 mmol/L      CO2 24.0 mmol/L      Calcium 9.1 mg/dL      Total Protein 6.6 g/dL      Albumin 4.40 g/dL      ALT (SGPT) 18 U/L      AST (SGOT) 17 U/L      Alkaline  Phosphatase 64 U/L      Total Bilirubin 0.9 mg/dL      eGFR Non African Amer 120 mL/min/1.73      Globulin 2.2 gm/dL      A/G Ratio 2.0 g/dL      BUN/Creatinine Ratio --     Comment: Testing not performed        Anion Gap 12.0 mmol/L     Narrative:       GFR Normal >60  Chronic Kidney Disease <60  Kidney Failure <15      Lipase [[id number]]  (Normal) Collected:  07/10/20 1601    Specimen:  Blood Updated:  07/10/20 1631     Lipase 21 U/L     CBC & Differential [[id number]] Collected:  07/10/20 1601    Specimen:  Blood Updated:  07/10/20 1611    Narrative:       The following orders were created for panel order CBC & Differential.  Procedure                               Abnormality         Status                     ---------                               -----------         ------                     CBC Auto Differential[[id number]]        Abnormal            Final result                 Please view results for these tests on the individual orders.    CBC Auto Differential [[id number]]  (Abnormal) Collected:  07/10/20 1601    Specimen:  Blood Updated:  07/10/20 1611     WBC 14.90 10*3/mm3      RBC 4.93 10*6/mm3      Hemoglobin 15.0 g/dL      Hematocrit 43.4 %      MCV 87.9 fL      MCH 30.3 pg      MCHC 34.5 g/dL      RDW 13.1 %      RDW-SD 40.7 fl      MPV 8.0 fL      Platelets 216 10*3/mm3      Neutrophil % 88.4 %      Lymphocyte % 8.8 %      Monocyte % 2.2 %      Eosinophil % 0.1 %      Basophil % 0.5 %      Neutrophils, Absolute 13.20 10*3/mm3      Lymphocytes, Absolute 1.30 10*3/mm3      Monocytes, Absolute 0.30 10*3/mm3      Eosinophils, Absolute 0.00 10*3/mm3      Basophils, Absolute 0.10 10*3/mm3      nRBC 0.0 /100 WBC         Imaging Results (Last 24 Hours)     Procedure Component Value Units Date/Time    XR Chest 1 View [[id number]] Collected:  07/11/20 0948     Updated:  07/11/20 0951    Narrative:       DATE OF EXAM:  7/11/2020 5:56 AM     PROCEDURE:  XR CHEST 1 VW-     INDICATIONS:  Presurgical evaluation,  cholelithiasis with right upper quadrant  abdominal pain and leukocytosis.      COMPARISON:  01/04/2019.     TECHNIQUE:   Single radiographic view of the chest was obtained.     FINDINGS:  The heart size is normal. The pulmonary vascular markings are normal.  The lungs and pleural spaces are clear of active disease.  The bony  thorax is normal for age.       Impression:       No active disease.     Electronically Signed By-Deepak Whitaker On:7/11/2020 9:49 AM  This report was finalized on [id number] by  Deepak Whitaker, .    US Gallbladder [[id number]] Collected:  07/10/20 1941     Updated:  07/10/20 1945    Narrative:       US GALLBLADDER-     Date of Exam: 7/10/2020 7:14 PM     Indication: upper abd pain/abnormal ct.     Comparison: CT abdomen pelvis from earlier today     Technique: Right upper quadrant ultrasound     FINDINGS:     The pancreas is sonographically normal. The liver appears normal in  contour and echotexture, with no focal mass identified. The hepatic  vasculature appears within normal limits. There are multiple stones  within the gallbladder, but no abnormal gallbladder wall thickening,  pericholecystic fluid, or sonographic Booth's sign to suggest acute  cholecystitis. The common bile duct is normal in caliber measuring 5.8  mm. The right kidney is normal in size and echogenicity, measuring 11.8  cm, with no stone, mass, or hydronephrosis identified.       Impression:       Cholelithiasis, but no convincing evidence of acute cholecystitis.     Electronically Signed By-DR. Chuck Gee MD On:7/10/2020 7:43 PM  This report was finalized on [id number] by DR. Chuck Gee MD.    CT Abdomen Pelvis With Contrast [[id number]] Collected:  07/10/20 1803     Updated:  07/10/20 1811    Narrative:       CT ABDOMEN PELVIS W CONTRAST-     Date of Exam: 7/10/2020 4:50 PM     Indication: abd pain.     Comparison: 01/08/2018     Technique: CT scan of the abdomen and pelvis was performed after the  uneventful  administration of 100 mL IV Isovue 370.  Automated exposure  control and iterative reconstruction methods were used.     FINDINGS:  The lung bases are clear.     The liver, adrenal glands, kidneys, spleen, and pancreas are  unremarkable. There are multiple stones in the gallbladder.     The stomach appears normal. The small bowel appears normal in caliber  and configuration. There is sigmoid diverticulosis. The appendix appears  normal. There is no ascites or loculated collection. No abnormally  enlarged lymph nodes are identified.     The rectum, prostate, and urinary bladder are unremarkable.     No aggressive osseous lesions are identified.       Impression:       1.No acute process identified within abdomen/pelvis.  2.Cholelithiasis.  3.Sigmoid diverticulosis.     Electronically Signed By-DR. Chuck Gee MD On:7/10/2020 6:09 PM  This report was finalized on [id number] by DR. Chuck Gee MD.          I reviewed the patient's other test results and agree with the interpretation  I reviewed the patient's new imaging results and agree with the interpretation.    Assessment/Plan     Principal Problem:    RUQ pain  Active Problems:    Morbid obesity (CMS/HCC)      Symptomatic cholelithiasis without evidence of cholecystitis however patient is tender in his right upper quadrant.  There are no signs of any trauma from his fall off a ladder.  I do think he would benefit from laparoscopic cholecystectomy.  We will proceed with this later today.  I discussed the risk of general anesthesia bleeding infection injury to surrounding structures or conversion to open procedure with the patient and his wife who was on the phone during our consultation.    I discussed the patients findings and my recommendations with patient.     Monica Coulter MD  07/11/20  13:37

## 2020-07-11 NOTE — ANESTHESIA PREPROCEDURE EVALUATION
Anesthesia Evaluation     Patient summary reviewed   NPO Solid Status: > 8 hours  NPO Liquid Status: > 8 hours           Airway   Mallampati: II  TM distance: >3 FB  Neck ROM: full  No difficulty expected  Dental - normal exam     Pulmonary - normal exam   Cardiovascular - normal exam  Exercise tolerance: good (4-7 METS)    ECG reviewed        Neuro/Psych  GI/Hepatic/Renal/Endo    (+) morbid obesity,      Musculoskeletal     Abdominal  - normal exam    Bowel sounds: normal.   Substance History      OB/GYN          Other                        Anesthesia Plan    ASA 3     general     intravenous induction     Anesthetic plan, all risks, benefits, and alternatives have been provided, discussed and informed consent has been obtained with: patient.

## 2020-07-11 NOTE — OP NOTE
Operative Note:    Patient Name:  Rodolfo Ramon  YOB: 1965    Date of Surgery:  7/11/2020     Indications: Patient is a very pleasant 55-year-old gentleman with symptomatic cholelithiasis.    Pre-op Diagnosis:   RUQ pain [R10.11]       Post-Op Diagnosis Codes:     * RUQ pain [R10.11]    Procedure/CPT® Codes:      Procedure(s):  CHOLECYSTECTOMY LAPAROSCOPIC    Staff:  Surgeon(s):  Monica Coulter MD         Anesthesia: General    Estimated Blood Loss: minimal    Implants:    Nothing was implanted during the procedure    Specimen:          A: To pathology        Findings: Acute cholecystitis    Complications: None    Description of Procedure: Patient was brought to the operating room and transferred to the operating table in the standard supine position.  After adequate anesthesia was obtained a timeout was performed in the region of the abdomen was sterilely prepped and draped.  A 2 cm supraumbilical incision was made with a #15 blade and carried out probably to the level of the fascia.  Fascia was grasped with 2 Ana clamps and incised and a blunt finger sweep was performed.  2-0 Vicryl stay sutures were placed the Basilio cannula was introduced and carbon dioxide insufflation was begun.  Under direct visualization 2 5 mm right upper quadrant ports and a 5 mm subxiphoid port were placed.  Gallbladder was identified and retracted cephalad.  Cystic duct and cystic artery reach identified dissected and seen clearly entering onto the gallbladder.  Each doubly clipped and divided area after this the gallbladder was elevated from the gallbladder fossa using Bovie cautery and the spatula apparatus.  It was removed under direct visualization via an Endo Catch bag from the umbilical port site.  Hemostasis was verified.  Irrigation was completed.  And all ports were removed.  The umbilical port site was closed with 0 Vicryl in a figure-of-eight.  Skin was closed with staples.    Patient tolerated the  procedure well.    Sponge and instrument counts correct x2.        Monica Coulter MD     Date: 7/11/2020  Time: 15:24

## 2020-07-11 NOTE — DISCHARGE SUMMARY
[facility] Medicine Services  DISCHARGE SUMMARY        Prepared For PCP:  Provider, No Known    Patient Name: Rodolfo Ramon  : 1965  MRN: [id number]      Date of Admission:   7/10/2020    Date of Discharge:  2020    Length of stay:  LOS: 0 days     Hospital Course     Presenting Problem:   Gallstones [K80.20]  RUQ pain [R10.11]  Leukocytosis, unspecified type [D72.829]      Active Hospital Problems    Diagnosis  POA   • Morbid obesity (CMS/HCC) [E66.01]  Yes     Priority: Medium      Resolved Hospital Problems    Diagnosis Date Resolved POA   • **Biliary colic [K80.50] 2020 Yes     Priority: High   • RUQ pain [R10.11] 2020 Yes     Priority: Medium           Hospital Course:    The patient was placed on observation.  The patient was evaluated by general surgeon regarding possible biliary colic.  He underwent laparoscopic cholecystectomy in the afternoon of 2020.        Recommendation for Outpatient Providers:             Reasons For Change In Medications and Indications for New Medications:        Day of Discharge     HPI:     The patient is 55-years-old male that presented to the ED on 7/10/20 complaining of several hours of  right upper quadrant abdominal pain.  The patient also complains of falling off a ladder on 7/10/2020.         Vital Signs:   Temp:  [97.7 °F (36.5 °C)-98.6 °F (37 °C)] 98.6 °F (37 °C)  Heart Rate:  [67-93] 76  Resp:  [12-22] 18  BP: (121-163)/(60-80) 144/80     Physical Exam:  Physical Exam   Constitutional: He is oriented to person, place, and time. He appears well-developed and well-nourished.   HENT:   Head: Normocephalic and atraumatic.   Eyes: Pupils are equal, round, and reactive to light. EOM are normal. No scleral icterus.   Neck: Normal range of motion. Neck supple. No thyroid mass present.   Cardiovascular: Normal rate and regular rhythm.   Pulmonary/Chest: Effort normal and breath sounds normal.   Abdominal: Bowel sounds are  normal. There is tenderness in the right upper quadrant. There is no rigidity and no guarding.   Musculoskeletal:   Moves all extremities equally   Lymphadenopathy:     He has no cervical adenopathy.   Neurological: He is alert and oriented to person, place, and time. No cranial nerve deficit or sensory deficit.   Skin: Skin is warm and dry. No rash noted.   Psychiatric: He has a normal mood and affect. His speech is normal and behavior is normal.       Pertinent  and/or Most Recent Results     Results from last 7 days   Lab Units 07/11/20  0423 07/10/20  1601   WBC 10*3/mm3 13.90* 14.90*   HEMOGLOBIN g/dL 14.4 15.0   HEMATOCRIT % 43.1 43.4   PLATELETS 10*3/mm3 216 216   SODIUM mmol/L 141 136   POTASSIUM mmol/L 4.6 4.5   CHLORIDE mmol/L 105 100   CO2 mmol/L 29.0 24.0   BUN  7 13   CREATININE mg/dL 0.72* 0.68*   GLUCOSE mg/dL 105* 128*   CALCIUM mg/dL 8.8 9.1     Results from last 7 days   Lab Units 07/11/20  0423 07/10/20  1601   BILIRUBIN mg/dL  --  0.9   ALK PHOS U/L  --  64   ALT (SGPT) U/L  --  18   AST (SGOT) U/L  --  17   PROTIME Seconds 11.0  --    INR  1.06  --    APTT seconds 27.1  --            Invalid input(s): TG, LDLCALC, LDLREALC        Brief Urine Lab Results  (Last result in the past 365 days)      Color   Clarity   Blood   Leuk Est   Nitrite   Protein   CREAT   Urine HCG        07/10/20 1617 Dark Yellow  Comment:  Result checked  Clear Trace Negative Negative Trace               Microbiology Results Abnormal     Procedure Component Value - Date/Time    COVID-19,CEPHEID,COR/JAMEL/PAD IN-HOUSE(OR EMERGENT/ADD-ON),NP SWAB IN TRANSPORT MEDIA 3-4 HR TAT - Swab, Nasopharynx [[id number]]  (Normal) Collected:  07/11/20 0240    Lab Status:  Final result Specimen:  Swab from Nasopharynx Updated:  07/11/20 0412     COVID19 Not Detected    Narrative:       Fact sheet for providers: https://www.fda.gov/media/586414/download     Fact sheet for patients: https://www.fda.gov/media/018393/download          Ct Abdomen  Pelvis With Contrast    Result Date: 7/10/2020  Impression: 1.No acute process identified within abdomen/pelvis. 2.Cholelithiasis. 3.Sigmoid diverticulosis.  Electronically Signed By-DR. Chuck Gee MD On:7/10/2020 6:09 PM This report was finalized on [id number] by DR. Chuck Gee MD.    Us Gallbladder    Result Date: 7/10/2020  Impression: Cholelithiasis, but no convincing evidence of acute cholecystitis.  Electronically Signed By-DR. Chuck Gee MD On:7/10/2020 7:43 PM This report was finalized on [id number] by DR. Chuck Gee MD.    Xr Chest 1 View    Result Date: 7/11/2020  Impression: No active disease.  Electronically Signed By-Deepak Whitaker On:7/11/2020 9:49 AM This report was finalized on [id number] by  Deepak Whitaker, .                             Test Results Pending at Discharge   Order Current Status    Tissue Pathology Exam Collected (07/11/20 6490)            Procedures Performed  Procedure(s):  CHOLECYSTECTOMY LAPAROSCOPIC         Consults:   Consults     Date and Time Order Name Status Description    7/11/2020 0928 Inpatient General Surgery Consult      7/10/2020 2008 Hospitalist (on-call MD unless specified) Completed             Discharge Details        Discharge Medications      New Medications      Instructions Start Date   oxyCODONE 5 MG immediate release tablet  Commonly known as:  Roxicodone   5 mg, Oral, Every 6 Hours PRN             Allergies   Allergen Reactions   • Hydrocodone-Acetaminophen Hives and Nausea Only         Discharge Disposition:  Home or Self Care    Diet:  Hospital:  Diet Order   Procedures   • NPO Diet   • Diet Regular         Discharge Activity:         CODE STATUS:    Code Status and Medical Interventions:   Ordered at: 07/11/20 1635     Code Status:    CPR     Medical Interventions (Level of Support Prior to Arrest):    Full         Follow-up Appointments  No future appointments.    Additional Instructions for the Follow-ups that You Need to  Schedule     Discharge Follow-up with PCP   As directed       Currently Documented PCP:    Provider, No Known    PCP Phone Number:    None     Follow Up Details:  2 weeks                 Condition on Discharge:      Stable      This patient has been examined wearing appropriate Personal Protective Equipment . 07/11/20      Electronically signed by Xavier Zapata DO, 07/11/20, 5:47 PM.      Time: I spent  15  minutes on this discharge activity which included face-to-face encounter with the patient/reviewing the data in the system/coordination of the care with the nursing staff as well as consultants/documentation/entering orders.

## 2020-07-11 NOTE — PROGRESS NOTES
"      [facility] Medicine Services Daily Progress Note      Hospitalist Team  LOS 0 days      Patient Care Team:  Provider, No Known as PCP - General    Patient Location: [facility] MAIN OR/MAIN OR      Subjective   Subjective     Chief Complaint / Subjective  Chief Complaint   Patient presents with   • Abdominal Pain         Brief Synopsis of Hospital Course/HPI    The patient is 55-years-old male that presented to the ED on 7/10/20 complaining of several hours of  right upper quadrant abdominal pain.  The patient also complains of falling off a ladder on 7/10/2020.    Date::    7/11/20: Complains of right upper quadrant pain.  General surgery consulted.      Review of Systems   All other systems reviewed and are negative.        Objective   Objective      Vital Signs  Temp:  [98 °F (36.7 °C)-98.5 °F (36.9 °C)] 98 °F (36.7 °C)  Heart Rate:  [64-85] 67  Resp:  [12-18] 12  BP: (122-163)/(54-80) 125/67  Oxygen Therapy  SpO2: 96 %  Pulse Oximetry Type: Continuous  Device (Oxygen Therapy): room air  Flowsheet Rows      First Filed Value   Admission Height  167.6 cm (66\") Documented at 07/10/2020 1515   Admission Weight  118 kg (260 lb 5.8 oz) Documented at 07/10/2020 1515        Intake & Output (last 3 days)       07/08 0701 - 07/09 0700 07/09 0701 - 07/10 0700 07/10 0701 - 07/11 0700 07/11 0701 - 07/12 0700    I.V. (mL/kg)   719 (6.1)     Total Intake(mL/kg)   719 (6.1)     Urine (mL/kg/hr)   550     Total Output   550     Net   +169             Urine Unmeasured Occurrence   1 x         Lines, Drains & Airways    Active LDAs     Name:   Placement date:   Placement time:   Site:   Days:    Peripheral IV 07/10/20 1556 Right Antecubital   07/10/20    1556    Antecubital   less than 1                  Physical Exam:    Physical Exam   Constitutional: He is oriented to person, place, and time. He appears well-developed and well-nourished.   HENT:   Head: Normocephalic and atraumatic.   Eyes: Pupils are equal, " round, and reactive to light. EOM are normal.   Neck: Normal range of motion. Neck supple.   Cardiovascular: Normal rate and regular rhythm.   Pulmonary/Chest: Effort normal and breath sounds normal.   Abdominal: Bowel sounds are normal. There is tenderness in the right upper quadrant.   Musculoskeletal:   Moving all extremities   Lymphadenopathy:     He has no cervical adenopathy.   Neurological: He is alert and oriented to person, place, and time. No cranial nerve deficit or sensory deficit.   Skin: Skin is warm and dry. No rash noted.   Psychiatric: He has a normal mood and affect. His speech is normal and behavior is normal. Cognition and memory are normal.             Procedures:    Procedure(s):  CHOLECYSTECTOMY LAPAROSCOPIC          Results Review:     I reviewed the patient's new clinical results.      Lab Results (last 24 hours)     Procedure Component Value Units Date/Time    BUN [[id number]]  (Normal) Collected:  07/11/20 0423    Specimen:  Blood Updated:  07/11/20 0721     BUN 7 mg/dL     Basic Metabolic Panel [[id number]]  (Abnormal) Collected:  07/11/20 0423    Specimen:  Blood Updated:  07/11/20 0456     Glucose 105 mg/dL      BUN --     Comment: Testing performed by alternate method        Creatinine 0.72 mg/dL      Sodium 141 mmol/L      Potassium 4.6 mmol/L      Chloride 105 mmol/L      CO2 29.0 mmol/L      Calcium 8.8 mg/dL      eGFR Non African Amer 113 mL/min/1.73      BUN/Creatinine Ratio --     Comment: Testing not performed        Anion Gap 7.0 mmol/L     Narrative:       GFR Normal >60  Chronic Kidney Disease <60  Kidney Failure <15      Protime-INR [[id number]]  (Normal) Collected:  07/11/20 0423    Specimen:  Blood Updated:  07/11/20 0451     Protime 11.0 Seconds      INR 1.06    aPTT [[id number]]  (Normal) Collected:  07/11/20 0423    Specimen:  Blood Updated:  07/11/20 0451     PTT 27.1 seconds     CBC & Differential [[id number]] Collected:  07/11/20 0423    Specimen:  Blood Updated:   07/11/20 0435    Narrative:       The following orders were created for panel order CBC & Differential.  Procedure                               Abnormality         Status                     ---------                               -----------         ------                     CBC Auto Differential[[id number]]        Abnormal            Final result                 Please view results for these tests on the individual orders.    CBC Auto Differential [[id number]]  (Abnormal) Collected:  07/11/20 0423    Specimen:  Blood Updated:  07/11/20 0435     WBC 13.90 10*3/mm3      RBC 4.81 10*6/mm3      Hemoglobin 14.4 g/dL      Hematocrit 43.1 %      MCV 89.8 fL      MCH 30.0 pg      MCHC 33.4 g/dL      RDW 13.1 %      RDW-SD 40.7 fl      MPV 8.0 fL      Platelets 216 10*3/mm3      Neutrophil % 71.8 %      Lymphocyte % 20.6 %      Monocyte % 6.5 %      Eosinophil % 0.6 %      Basophil % 0.5 %      Neutrophils, Absolute 10.00 10*3/mm3      Lymphocytes, Absolute 2.90 10*3/mm3      Monocytes, Absolute 0.90 10*3/mm3      Eosinophils, Absolute 0.10 10*3/mm3      Basophils, Absolute 0.10 10*3/mm3      nRBC 0.1 /100 WBC     COVID-19,CEPHEID,COR/JAMEL/PAD IN-HOUSE(OR EMERGENT/ADD-ON),NP SWAB IN TRANSPORT MEDIA 3-4 HR TAT - Swab, Nasopharynx [[id number]]  (Normal) Collected:  07/11/20 0240    Specimen:  Swab from Nasopharynx Updated:  07/11/20 0412     COVID19 Not Detected    Narrative:       Fact sheet for providers: https://www.fda.gov/media/707023/download     Fact sheet for patients: https://www.fda.gov/media/044274/download    Extra Tubes [[id number]] Collected:  07/10/20 1601    Specimen:  Blood, Venous Line Updated:  07/10/20 1715    Narrative:       The following orders were created for panel order Extra Tubes.  Procedure                               Abnormality         Status                     ---------                               -----------         ------                     Light Blue Top[[id number]]                                    Final result               Gold Top - [facility][[id number]]                                   Final result                 Please view results for these tests on the individual orders.    Gold Top - SST [[id number]] Collected:  07/10/20 1601    Specimen:  Blood Updated:  07/10/20 1715     Extra Tube Hold for add-ons.     Comment: Auto resulted.       Light Blue Top [[id number]] Collected:  07/10/20 1601    Specimen:  Blood Updated:  07/10/20 1715     Extra Tube hold for add-on     Comment: Auto resulted       Urinalysis With Culture If Indicated - Urine, Clean Catch [[id number]]  (Abnormal) Collected:  07/10/20 1617    Specimen:  Urine, Clean Catch Updated:  07/10/20 1639     Color, UA Dark Yellow     Comment: Result checked         Appearance, UA Clear     pH, UA 5.5     Specific Gravity, UA 1.036     Glucose, UA Negative     Ketones, UA 40 mg/dL (2+)     Bilirubin, UA Small (1+)     Comment: Confirmation testing is unavailable.  A serum bilirubin is recommended for further assessment.        Blood, UA Trace     Protein, UA Trace     Leuk Esterase, UA Negative     Nitrite, UA Negative     Urobilinogen, UA 0.2 E.U./dL    Urinalysis, Microscopic Only - Urine, Clean Catch [[id number]]  (Abnormal) Collected:  07/10/20 1617    Specimen:  Urine, Clean Catch Updated:  07/10/20 1639     RBC, UA 13-20 /HPF      WBC, UA 0-2 /HPF      Bacteria, UA None Seen /HPF      Squamous Epithelial Cells, UA None Seen /HPF      Hyaline Casts, UA None Seen /LPF      Methodology Automated Microscopy    BUN [[id number]]  (Normal) Collected:  07/10/20 1601    Specimen:  Blood Updated:  07/10/20 1632     BUN 13 mg/dL     Comprehensive Metabolic Panel [[id number]]  (Abnormal) Collected:  07/10/20 1601    Specimen:  Blood Updated:  07/10/20 1631     Glucose 128 mg/dL      BUN --     Comment: Testing performed by alternate method        Creatinine 0.68 mg/dL      Sodium 136 mmol/L      Potassium 4.5 mmol/L      Chloride 100 mmol/L      CO2 24.0  mmol/L      Calcium 9.1 mg/dL      Total Protein 6.6 g/dL      Albumin 4.40 g/dL      ALT (SGPT) 18 U/L      AST (SGOT) 17 U/L      Alkaline Phosphatase 64 U/L      Total Bilirubin 0.9 mg/dL      eGFR Non African Amer 120 mL/min/1.73      Globulin 2.2 gm/dL      A/G Ratio 2.0 g/dL      BUN/Creatinine Ratio --     Comment: Testing not performed        Anion Gap 12.0 mmol/L     Narrative:       GFR Normal >60  Chronic Kidney Disease <60  Kidney Failure <15      Lipase [[id number]]  (Normal) Collected:  07/10/20 1601    Specimen:  Blood Updated:  07/10/20 1631     Lipase 21 U/L     CBC & Differential [[id number]] Collected:  07/10/20 1601    Specimen:  Blood Updated:  07/10/20 1611    Narrative:       The following orders were created for panel order CBC & Differential.  Procedure                               Abnormality         Status                     ---------                               -----------         ------                     CBC Auto Differential[[id number]]        Abnormal            Final result                 Please view results for these tests on the individual orders.    CBC Auto Differential [[id number]]  (Abnormal) Collected:  07/10/20 1601    Specimen:  Blood Updated:  07/10/20 1611     WBC 14.90 10*3/mm3      RBC 4.93 10*6/mm3      Hemoglobin 15.0 g/dL      Hematocrit 43.4 %      MCV 87.9 fL      MCH 30.3 pg      MCHC 34.5 g/dL      RDW 13.1 %      RDW-SD 40.7 fl      MPV 8.0 fL      Platelets 216 10*3/mm3      Neutrophil % 88.4 %      Lymphocyte % 8.8 %      Monocyte % 2.2 %      Eosinophil % 0.1 %      Basophil % 0.5 %      Neutrophils, Absolute 13.20 10*3/mm3      Lymphocytes, Absolute 1.30 10*3/mm3      Monocytes, Absolute 0.30 10*3/mm3      Eosinophils, Absolute 0.00 10*3/mm3      Basophils, Absolute 0.10 10*3/mm3      nRBC 0.0 /100 WBC         No results found for: HGBA1C  Results from last 7 days   Lab Units 07/11/20  0423   INR  1.06           Lab Results   Component Value Date    LIPASE  21 07/10/2020     Lab Results   Component Value Date    CHOL 110 01/24/2017    TRIG 158 (H) 01/24/2017    HDL 23 (L) 01/24/2017    LDL 64 01/24/2017       No results found for: INTRAOP, PREDX, FINALDX, COMDX    Microbiology Results (last 10 days)     Procedure Component Value - Date/Time    COVID-19,CEPHEID,COR/JAMEL/PAD IN-HOUSE(OR EMERGENT/ADD-ON),NP SWAB IN TRANSPORT MEDIA 3-4 HR TAT - Swab, Nasopharynx [[id number]]  (Normal) Collected:  07/11/20 0240    Lab Status:  Final result Specimen:  Swab from Nasopharynx Updated:  07/11/20 0412     COVID19 Not Detected    Narrative:       Fact sheet for providers: https://www.fda.gov/media/502764/download     Fact sheet for patients: https://www.fda.gov/media/966835/download          ECG/EMG Results (most recent)     Procedure Component Value Units Date/Time    ECG 12 Lead [[id number]] Collected:  07/11/20 0604     Updated:  07/11/20 0606    Narrative:       HEART RATE= 73  bpm  RR Interval= 820  ms  CA Interval= 161  ms  P Horizontal Axis= 5  deg  P Front Axis= 83  deg  QRSD Interval= 98  ms  QT Interval= 371  ms  QRS Axis= 89  deg  T Wave Axis= 60  deg  - NORMAL ECG -  Sinus rhythm  When compared with ECG of 23-Jan-2017 9:36:00,  No significant change  Electronically Signed By:   Date and Time of Study: 2020-07-11 06:04:42                    Ct Abdomen Pelvis With Contrast    Result Date: 7/10/2020  1.No acute process identified within abdomen/pelvis. 2.Cholelithiasis. 3.Sigmoid diverticulosis.  Electronically Signed By-DR. Chuck Gee MD On:7/10/2020 6:09 PM This report was finalized on [id number] by DR. Chuck Gee MD.    Us Gallbladder    Result Date: 7/10/2020  Cholelithiasis, but no convincing evidence of acute cholecystitis.  Electronically Signed By-DR. Chuck Gee MD On:7/10/2020 7:43 PM This report was finalized on [id number] by DR. Chuck Gee MD.    Xr Chest 1 View    Result Date: 7/11/2020  No active disease.  Electronically Signed  By-Deepak Whitaker On:7/11/2020 9:49 AM This report was finalized on [id number] by  Deepak Whitaker, .          Xrays, labs reviewed personally by physician.    Medication Review:   I have reviewed the patient's current medication list      Scheduled Meds    [MAR Hold] nicotine 1 patch Transdermal Q24H   [MAR Hold] sodium chloride 10 mL Intravenous Q12H       Meds Infusions    sodium chloride 0.9 % with KCl 20 mEq 100 mL/hr Last Rate: 100 mL/hr (07/11/20 0524)       Meds PRN  [MAR Hold] ondansetron **OR** [MAR Hold] ondansetron  •  [MAR Hold] oxyCODONE  •  [COMPLETED] Insert peripheral IV **AND** [MAR Hold] sodium chloride  •  [MAR Hold] sodium chloride      Assessment/Plan   Assessment/Plan     Active Hospital Problems    Diagnosis  POA   • **RUQ pain [R10.11]  Yes   • Morbid obesity (CMS/HCC) [E66.01]  Yes      Resolved Hospital Problems   No resolved problems to display.       MEDICAL DECISION MAKING COMPLEXITY BY PROBLEM:     1. RUQ abdominal pain:  -s/p RUQ US and CT abd/pelvis  -Consulted general surgery    2.  Tobacco abuse:  -Continue nicotine patch      VTE Prophylaxis -   Mechanical Order History:      Ordered        07/11/20 1204  Place Sequential Compression Device on Patient in Pre-Op  Once         07/10/20 2040  Place Sequential Compression Device  Once         07/10/20 2040  Maintain Sequential Compression Device  Continuous                 Pharmalogical Order History:     None            Code Status -   Code Status and Medical Interventions:   Ordered at: 07/10/20 2040     Code Status:    CPR     Medical Interventions (Level of Support Prior to Arrest):    Full       This patient has been examined wearing appropriate Personal Protective Equipment . 07/11/20    Discharge Planning      Destination      Coordination has not been started for this encounter.      Durable Medical Equipment      Coordination has not been started for this encounter.      Dialysis/Infusion      Coordination has not been started for  this encounter.      Home Medical Care      Coordination has not been started for this encounter.      Therapy      Coordination has not been started for this encounter.      Community Resources      Coordination has not been started for this encounter.            Electronically signed by Xavier Zapata DO, 07/11/20, 14:35.  [facility] Hospitalist Team

## 2020-07-13 PROCEDURE — 93010 ELECTROCARDIOGRAM REPORT: CPT | Performed by: INTERNAL MEDICINE

## 2020-07-13 NOTE — PROGRESS NOTES
Case Management Discharge Note      Final Note: DC to home              Final Discharge Disposition Code: 01 - home or self-care

## 2020-07-14 LAB
LAB AP CASE REPORT: NORMAL
PATH REPORT.FINAL DX SPEC: NORMAL
PATH REPORT.GROSS SPEC: NORMAL

## 2020-07-31 ENCOUNTER — OFFICE VISIT (OUTPATIENT)
Dept: SURGERY | Facility: CLINIC | Age: 55
End: 2020-07-31

## 2020-07-31 VITALS
DIASTOLIC BLOOD PRESSURE: 86 MMHG | OXYGEN SATURATION: 94 % | HEART RATE: 89 BPM | HEIGHT: 66 IN | SYSTOLIC BLOOD PRESSURE: 155 MMHG | TEMPERATURE: 98.4 F | BODY MASS INDEX: 41.78 KG/M2 | WEIGHT: 260 LBS

## 2020-07-31 DIAGNOSIS — Z09 POSTOPERATIVE FOLLOW-UP: Primary | ICD-10-CM

## 2020-07-31 PROCEDURE — 99024 POSTOP FOLLOW-UP VISIT: CPT | Performed by: PHYSICIAN ASSISTANT

## 2020-07-31 NOTE — PROGRESS NOTES
HPI  Rodolfo Ramon presents for postoperative follow up s/p laparoscopic cholecystectomy on 7/11/2020. The patient has had a relatively normal postoperative course.  The patient has had no current complaints. The patient has had improving normal postoperative pain.  The patient has had no issues with the wound.         Physical Exam  General:  alert, appears stated age and cooperative  Incisions:   healing well, no drainage, no erythema, incisions well approximated other than some uneven skin approximation at periumbilical incision     Assessment/Plan   Assessment:    1. Postoperative follow-up        Plan:   Continue postoperative wound care as instructed-antibiotic ointment to periumbilical incision daily until completely healed    Pathology reviewed, consistent with:   Acute on chronic calculous cholecystitis  Discussed pathology with patient    Advised patient no lifting over 25 pounds until 1 month postop.        Call with any questions or concerns.    Return today (on 7/31/2020), or if symptoms worsen or fail to improve.     Radha Estevez PA-C  7/31/2020

## 2025-08-20 ENCOUNTER — HOSPITAL ENCOUNTER (EMERGENCY)
Facility: HOSPITAL | Age: 60
Discharge: HOME | End: 2025-08-20
Attending: EMERGENCY MEDICINE | Admitting: EMERGENCY MEDICINE
Payer: COMMERCIAL

## 2025-08-20 ENCOUNTER — APPOINTMENT (OUTPATIENT)
Dept: CT IMAGING | Facility: HOSPITAL | Age: 60
End: 2025-08-20
Payer: COMMERCIAL

## (undated) DEVICE — CUFF SCD HEMOFORCE SEQ CALF STD MD

## (undated) DEVICE — SOL IRRIG H2O 1000ML STRL

## (undated) DEVICE — UNDERGLV SURG BIOGEL INDICAT PF 61/2 GRN

## (undated) DEVICE — GENERAL LAPAROSCOPY CDS: Brand: MEDLINE INDUSTRIES, INC.

## (undated) DEVICE — LAPAROSCOPIC GAS CONDITIONING DEVICE.: Brand: INSUFLOW

## (undated) DEVICE — SOL NACL 0.9PCT 1000ML

## (undated) DEVICE — ENDOPOUCH RETRIEVER SPECIMEN RETRIEVAL BAGS: Brand: ENDOPOUCH RETRIEVER

## (undated) DEVICE — UNDERGLV SURG BIOGEL INDICATOR LTX PF 7

## (undated) DEVICE — SUT VIC 0/0 UR6 27IN DYED J603H

## (undated) DEVICE — BNDG ADHS PLSTC 1X3IN LF

## (undated) DEVICE — ENDOPATH 5MM CURVED SCISSORS WITH MONOPOLAR CAUTERY: Brand: ENDOPATH

## (undated) DEVICE — ENDOPATH XCEL WITH OPTIVIEW TECHNOLOGY BLADELESS TROCARS WITH STABILITY SLEEVES: Brand: ENDOPATH XCEL OPTIVIEW

## (undated) DEVICE — ENDOPATH XCEL WITH OPTIVIEW TECHNOLOGY UNIVERSAL TROCAR STABILITY SLEEVES: Brand: ENDOPATH XCEL OPTIVIEW

## (undated) DEVICE — UNDYED BRAIDED (POLYGLACTIN 910), SYNTHETIC ABSORBABLE SUTURE: Brand: COATED VICRYL

## (undated) DEVICE — GLV SURG BIOGEL SENSR LTX PF SZ6.5

## (undated) DEVICE — PROXIMATE RH ROTATING HEAD SKIN STAPLERS (35 WIDE) CONTAINS 35 STAINLESS STEEL STAPLES: Brand: PROXIMATE

## (undated) DEVICE — PK PROC TURNOVER

## (undated) DEVICE — ENDOPATH XCEL BLUNT TIP TROCARS WITH SMOOTH SLEEVES: Brand: ENDOPATH XCEL

## (undated) DEVICE — ENDOPATH 5 MM GRASPERS WITH RATCHET HANDLES: Brand: ENDOPATH